# Patient Record
Sex: FEMALE | Race: WHITE | NOT HISPANIC OR LATINO | Employment: OTHER | URBAN - METROPOLITAN AREA
[De-identification: names, ages, dates, MRNs, and addresses within clinical notes are randomized per-mention and may not be internally consistent; named-entity substitution may affect disease eponyms.]

---

## 2025-02-05 ENCOUNTER — APPOINTMENT (OUTPATIENT)
Dept: DERMATOLOGY | Facility: CLINIC | Age: OVER 89
End: 2025-02-05
Payer: MEDICARE

## 2025-03-13 ENCOUNTER — APPOINTMENT (OUTPATIENT)
Dept: AUDIOLOGY | Facility: CLINIC | Age: OVER 89
End: 2025-03-13
Payer: MEDICARE

## 2025-03-13 DIAGNOSIS — H90.3 SENSORINEURAL HEARING LOSS (SNHL) OF BOTH EARS: Primary | ICD-10-CM

## 2025-03-13 PROCEDURE — HRANC PR HEARING AID NO CHARGE: Performed by: AUDIOLOGIST

## 2025-03-13 NOTE — PROGRESS NOTES
HEARING AID EVALUATION- [facility] CONSULT    The patient was seen today for a [facility] consult.  She currently wears Francesco rics with small vented domes and batteries.  She has a difficult time replacing the batteries, and she would like a new set of rechargeable hearing aids.  Discussed the various amplification options with the patient, including the various styles of hearing aids and the differences in technology levels.  She decided upon ordering two Phonak L30 R rechargeable RICs in the color P6 with #0 M receivers.  Put the order in the [facility] portal and scheduled the fitting and follow up appointments.  The patient owns a flip phone.  I will pair the aids to the cell phone and demonstrate streaming at the fitting.    APPOINTMENT TIME: 4:00-5:00

## 2025-03-13 NOTE — PROGRESS NOTES
AUDIOLOGY ADULT AUDIOMETRIC EVALUATION    Name:  Debbi Holman  :  1932  Age:  93 y.o.  Date of Evaluation:  2025    Reason for visit: Ms. Holman is seen in the clinic today for a [facility] consult.       HISTORY  The patient has a history of bilateral sensorineural hearing loss and wears binaural hearing aids.  She is interested in obtaining new hearing aids that are rechargeable since she is not able to handle batteries.      EVALUATION  See scanned audiogram: “Media” > “Audiology Report”.      RESULTS  Otoscopic Evaluation:  Right Ear: clear ear canal  Left Ear: clear ear canal    Immittance Measures:  Tympanometry:  Right Ear: Type A, normal tympanic membrane mobility with normal middle ear pressure   Left Ear: Type A, normal tympanic membrane mobility with normal middle ear pressure     Acoustic Reflexes:  Ipsilateral Right Ear: did not evaluate   Ipsilateral Left Ear: did not evaluate   Contralateral Right Ear: did not evaluate  Contralateral Left Ear: did not evaluate    Distortion Product Otoacoustic Emissions (DPOAEs):  Right Ear: did not evaluate   Left Ear: did not evaluate     Audiometry:  Test Technique and Reliability:   Standard audiometry via supra-aural headphones. Reliability is good.    Pure tone air and bone conduction audiometry:  Right Ear: mild sloping to severe sensorineural hearing loss   Left Ear: mild sloping to moderately-severe sensorineural hearing loss     Speech Audiometry (Word Recognition Scores):   Right Ear: Fair, 72% in quiet at an elevated presentation level   Left Ear: Good, 88% in quiet at an elevated presentation level     IMPRESSIONS  Results of today's audiometric evaluation revealed a bilateral sensorineural hearing loss.  No prior audiologic evaluation is available for comparison. Results of tympanometry testing indicated normal middle ear function in both ears.     RECOMMENDATIONS  - Annual audiologic evaluation, sooner if an  acute change is noted.  - Hearing aid evaluation for binaural hearing aids.     PATIENT EDUCATION  Discussed results, impressions and recommendations with the patient. Questions were addressed and the patient was encouraged to contact our office should concerns arise.    Time for this encounter: 3:30-4:00    Marleen Waterman M.A., CCC-A   Licensed Audiologist

## 2025-04-22 ENCOUNTER — NURSE TRIAGE (OUTPATIENT)
Dept: AUDIOLOGY | Facility: CLINIC | Age: OVER 89
End: 2025-04-22
Payer: MEDICARE

## 2025-04-22 NOTE — TELEPHONE ENCOUNTER
[facility] Call notes copied by Griselda Peralta on Tuesday April 22, 2025  1:16 PM  ------  Documentation by Griselda Peralta. [[id number]] 4/21/2025  5:01 PM  [facility]- waiting for delivery receipt; her haf is 4/30 do we have enough time to get aids if we return and repurchase?    Returned phone call: Ugo Avila at [facility] hearing aids are past authorized fitting date. She placed a new order, and current aids will be returned to .

## 2025-04-23 ENCOUNTER — TELEPHONE (OUTPATIENT)
Dept: AUDIOLOGY | Facility: CLINIC | Age: OVER 89
End: 2025-04-23
Payer: MEDICARE

## 2025-04-23 NOTE — TELEPHONE ENCOUNTER
Per Chuckie at [facility] the hearing aids need to be returned and reordered. The hearing aids will be returned to [facility] 4/23 and the order for the new aids was placed 4/22 by Carolina at [facility]. Carolina felt that the hearing aids should arrive on time for [facility] 4/30.

## 2025-04-30 ENCOUNTER — APPOINTMENT (OUTPATIENT)
Dept: AUDIOLOGY | Facility: CLINIC | Age: OVER 89
End: 2025-04-30

## 2025-04-30 ENCOUNTER — CLINICAL SUPPORT (OUTPATIENT)
Dept: AUDIOLOGY | Facility: CLINIC | Age: OVER 89
End: 2025-04-30
Payer: COMMERCIAL

## 2025-04-30 DIAGNOSIS — H90.3 SENSORINEURAL HEARING LOSS (SNHL) OF BOTH EARS: Primary | ICD-10-CM

## 2025-04-30 PROCEDURE — V5261 HEARING AID, DIGIT, BIN, BTE: HCPCS | Performed by: AUDIOLOGIST

## 2025-04-30 NOTE — PROGRESS NOTES
HEARING AID FITTING- [employer] [facility] [employer]    RIGHT: PHONAK AUDEO L30 R RECHARGEABLE LIZETH WITH A #0 M  AND SMALL POWER DOME WITH NO RETENTION TAIL  S.N.: [id number]  LEFT: PHONAK AUDEO L30 R RECHARGEABLE LIZETH WITH A #0 M  AND SMALL POWER DOME WITH NO RETENTION TAIL  S.N.: [id number]  WARRANTY EXPIRES: 5/22/2028    Fit the patient with the above listed hearing aids set to 100%.  Reduced the gain from 250-500 Hz slightly.  The patient has poor feeling in her fingers and had some difficulty inserting the aids.  Her grandson lives with her and is available to help her, but we discussed the possibility of fitting the aids with cshells.  They would be easier for her to insert and clean.  Discussed use and care of the hearing aids.  She has a flip phone, but she is not interested in streaming phone calls.  The patient decided to order cshells.  Took impressions of both ears without incident.  She paid the $230.00 today for the cshells and will be fit with them at her next appointment.  She will work with the power domes until the next visit.  In addition to today's verbal instruction of the hearing devices, the patient was given written instructions from the hearing aid . Hearing aid limitations were discussed at length as well as realistic expectations. The patient was advised in order to receive full benefit of amplification, consistent use during all waking hours is recommended.  The repair warranty and the conditions of the right-to-return period were discussed. The patient reports understanding of these conditions. The [employer] delivery receipt was signed and the aids were delivered in the [employer] portal.   Patient will return in 2 weeks for a hearing aid check.     Appointment time:  9:30-10:45

## 2025-05-14 ENCOUNTER — APPOINTMENT (OUTPATIENT)
Dept: AUDIOLOGY | Facility: CLINIC | Age: OVER 89
End: 2025-05-14

## 2025-05-14 ENCOUNTER — APPOINTMENT (OUTPATIENT)
Dept: AUDIOLOGY | Facility: CLINIC | Age: OVER 89
End: 2025-05-14
Payer: MEDICARE

## 2025-05-14 ENCOUNTER — CLINICAL SUPPORT (OUTPATIENT)
Dept: AUDIOLOGY | Facility: CLINIC | Age: OVER 89
End: 2025-05-14

## 2025-05-14 DIAGNOSIS — H90.3 SENSORINEURAL HEARING LOSS (SNHL) OF BOTH EARS: Primary | ICD-10-CM

## 2025-05-14 PROCEDURE — HRANC PR HEARING AID NO CHARGE: Performed by: AUDIOLOGIST

## 2025-05-14 NOTE — PROGRESS NOTES
HEARING AID CHECK- [facility]     RIGHT: PHONAgorafyEO L30 R RECHARGEABLE LIZETH WITH A #0 M  AND SMALL POWER DOME WITH NO RETENTION TAIL  S.N.: [id number]  LEFT: PHONAK AUDEO L30 R RECHARGEABLE LIZETH WITH A #0 M  AND SMALL POWER DOME WITH NO RETENTION TAIL  S.N.: [id number]  WARRANTY EXPIRES: 5/22/2028     Attempted to fit the patient with the new cshells, but the patient had some difficulty inserting the aids and a lot of difficulty removing the cshells.  I will send the cshells back to [facility] to see if there is something they can do to make the cshells easier to remove.  I will also have them lengthen the wire on the left cshell to a #1.  The patient will use the power domes for now.  Showed her son how to change the wax guards.       Appointment time:  9:00-9:50

## 2025-05-15 ENCOUNTER — DOCUMENTATION (OUTPATIENT)
Dept: AUDIOLOGY | Facility: CLINIC | Age: OVER 89
End: 2025-05-15
Payer: COMMERCIAL

## 2025-05-15 ENCOUNTER — TELEPHONE (OUTPATIENT)
Dept: AUDIOLOGY | Facility: CLINIC | Age: OVER 89
End: 2025-05-15
Payer: COMMERCIAL

## 2025-05-28 ENCOUNTER — TELEPHONE (OUTPATIENT)
Dept: AUDIOLOGY | Facility: CLINIC | Age: OVER 89
End: 2025-05-28
Payer: COMMERCIAL

## 2025-05-28 NOTE — TELEPHONE ENCOUNTER
Debbi is doing well with hearing aids; Jim had an appointment written and wanted to confirm appointment times.

## 2025-05-29 ENCOUNTER — APPOINTMENT (OUTPATIENT)
Dept: AUDIOLOGY | Facility: CLINIC | Age: OVER 89
End: 2025-05-29
Payer: MEDICARE

## 2025-06-04 ENCOUNTER — APPOINTMENT (OUTPATIENT)
Dept: AUDIOLOGY | Facility: CLINIC | Age: OVER 89
End: 2025-06-04

## 2025-06-26 ENCOUNTER — APPOINTMENT (OUTPATIENT)
Dept: AUDIOLOGY | Facility: CLINIC | Age: OVER 89
End: 2025-06-26

## 2025-06-26 DIAGNOSIS — H90.3 SENSORINEURAL HEARING LOSS (SNHL) OF BOTH EARS: Primary | ICD-10-CM

## 2025-06-26 PROCEDURE — HRANC PR HEARING AID NO CHARGE: Performed by: AUDIOLOGIST

## 2025-06-26 NOTE — PROGRESS NOTES
HEARING AID CHECK- [facility]     RIGHT: PHONAK AUDEO L30 R RECHARGEABLE LIZETH WITH A #0 M  AND SMALL POWER DOME WITH NO RETENTION TAIL  S.N.: [id number]  LEFT: PHONAK AUDEO L30 R RECHARGEABLE LIZETH WITH A #1 M  AND SMALL POWER DOME WITH NO RETENTION TAIL  S.N.: [id number]  WARRANTY EXPIRES: 5/22/2028     Originally fit the patient with the cshells and performed speechmapping.  Adjusted the hearing aids to approximate her targets for soft, moderate and loud speech inputs.   Unfortunately, the patient had extreme difficulty inserting the aids with the chsells on them.  We decided to switch back to the small power domes, and I encouraged her to try to get them in a little deeper than they were when she came in today.  Replaced the receivers and domes, and replaced the left #0 M  with a #1 M .  Her family member will replace the domes and wax guards for her.  She will schedule to come back for speechmapping at a later date due to time constraints.  N/C     Appointment time:  2:30-3:40

## 2025-07-30 ENCOUNTER — APPOINTMENT (OUTPATIENT)
Dept: AUDIOLOGY | Facility: CLINIC | Age: OVER 89
End: 2025-07-30

## 2025-08-21 ENCOUNTER — APPOINTMENT (OUTPATIENT)
Dept: AUDIOLOGY | Facility: CLINIC | Age: OVER 89
End: 2025-08-21
Payer: COMMERCIAL

## 2025-08-21 DIAGNOSIS — H90.3 SENSORINEURAL HEARING LOSS (SNHL) OF BOTH EARS: Primary | ICD-10-CM

## 2025-08-21 PROCEDURE — HRANC PR HEARING AID NO CHARGE: Performed by: AUDIOLOGIST
